# Patient Record
Sex: FEMALE | NOT HISPANIC OR LATINO | URBAN - METROPOLITAN AREA
[De-identification: names, ages, dates, MRNs, and addresses within clinical notes are randomized per-mention and may not be internally consistent; named-entity substitution may affect disease eponyms.]

---

## 2018-05-17 ENCOUNTER — IMPORTED ENCOUNTER (OUTPATIENT)
Dept: URBAN - METROPOLITAN AREA CLINIC 31 | Facility: CLINIC | Age: 43
End: 2018-05-17

## 2018-05-17 PROCEDURE — 92014 COMPRE OPH EXAM EST PT 1/>: CPT

## 2018-05-17 PROCEDURE — 92310 CONTACT LENS FITTING OU: CPT

## 2018-05-17 PROCEDURE — 92015 DETERMINE REFRACTIVE STATE: CPT

## 2018-05-17 NOTE — PATIENT DISCUSSION
1.   Insertion removal and care regimen successfully completed today. and Dispense trial contacts OU. To discontinue and call if any problems. 2.  CL check in 2 weeks. 3.   Refractive error

## 2018-06-08 ENCOUNTER — IMPORTED ENCOUNTER (OUTPATIENT)
Dept: URBAN - METROPOLITAN AREA CLINIC 31 | Facility: CLINIC | Age: 43
End: 2018-06-08

## 2018-06-12 PROBLEM — H10.403: Noted: 2018-06-12

## 2018-09-18 NOTE — PATIENT DISCUSSION
POSTERIOR CAPSULAR FIBROSIS/OPACIFICATION, OU- VISUALLY SIGNIFICANT. CONSULT DR. FUENTES FOR YAG EVAL.

## 2018-09-18 NOTE — PATIENT DISCUSSION
Dry Eye Syndrome (EL) Counseling: Dry Eye Syndrome, also known as Keratoconjunctivitis Sicca, is a common condition that occurs when your tears are not able to provide adequate lubrication for your eyes. Symptoms can be exacerbated by environmental factors such as smoke, wind, or prolonged eye use. Treatment options include, but are not limited to, artificial tears, punctal plugs, Restasis, oral omega-3 supplements, and lubricating ointments. I have explained to the patient that succesful management is dependent on patient compliance with treatment as prescribed and/or the treatment regimen.

## 2019-07-11 ENCOUNTER — IMPORTED ENCOUNTER (OUTPATIENT)
Dept: URBAN - METROPOLITAN AREA CLINIC 31 | Facility: CLINIC | Age: 44
End: 2019-07-11

## 2019-07-11 PROCEDURE — 92014 COMPRE OPH EXAM EST PT 1/>: CPT

## 2019-07-11 PROCEDURE — 92015 DETERMINE REFRACTIVE STATE: CPT

## 2019-07-11 NOTE — PATIENT DISCUSSION
1.  Refractive error - New progressives2. Return for an appointment in 1 year for comprehensive exam. with Dr. Danny Escalera.

## 2020-05-18 NOTE — PATIENT DISCUSSION
POSTERIOR CAPSULAR FIBROSIS/OPACIFICATION, OU - VISUALLY SIGNIFICANT. SCHEDULE YAG CAPSULOTOMY OD FIRST THEN LATER OS IF VISUAL SYMPTOMS PERSIST.  PT THINKS RIGHT EYE IS WORSE

## 2020-07-14 NOTE — PATIENT DISCUSSION
EL OU - PT MENTIONED SHE WAS SEEING  FOR EL AT END OF THE MONTH, CHECKED AND SHE DID NOT HAVE AN APPT. THEREFORE, RTC X 1 MO FOR EL EVAL/PP INSERTION PRN.

## 2020-08-11 NOTE — PATIENT DISCUSSION
INSERTION OF PUNCTAL PLUGS- I have discussed the indications, alternatives, potential benefits and risks including, but not limited to, infection, duct irritation, discomfort, excessive tearing and/or reinsertion. After instillation of topical anesthesia and gentle stretching of the inferior puncta, a punctal plug was placed in each lower lid. The patient tolerated the procedure well.

## 2020-08-11 NOTE — PATIENT DISCUSSION
EL/K SICCA, OU - PRESCRIBED ARTIFICIAL TEARS BID - QID, OU AND THE DAILY INTAKE OF OMEGA 3/FATTY ACIDS. PERM PP INSERTED RLL/LLL; PT TOLERATED PROCEDURE WELL TODAY AND RELEASED. FOLLOW.

## 2020-12-15 ENCOUNTER — IMPORTED ENCOUNTER (OUTPATIENT)
Dept: URBAN - METROPOLITAN AREA CLINIC 31 | Facility: CLINIC | Age: 45
End: 2020-12-15

## 2020-12-15 PROCEDURE — 92015 DETERMINE REFRACTIVE STATE: CPT

## 2020-12-15 PROCEDURE — 92014 COMPRE OPH EXAM EST PT 1/>: CPT

## 2020-12-15 NOTE — PATIENT DISCUSSION
1.  Refractive error - recommend [employer] Screen for computer work. 2. Return for an appointment in 1 year for comprehensive exam. with Dr. Colton Borrego.

## 2021-10-05 NOTE — PATIENT DISCUSSION
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 Will get baseline OCT mac today.

## 2021-11-22 ENCOUNTER — IMPORTED ENCOUNTER (OUTPATIENT)
Dept: URBAN - METROPOLITAN AREA CLINIC 31 | Facility: CLINIC | Age: 46
End: 2021-11-22

## 2021-11-22 PROCEDURE — 92015 DETERMINE REFRACTIVE STATE: CPT

## 2021-11-22 PROCEDURE — 92014 COMPRE OPH EXAM EST PT 1/>: CPT

## 2021-11-22 NOTE — PATIENT DISCUSSION
1.  Refractive error - Glasses change optional. Not wearing Cls. 2. Return for an appointment in 1 year for comprehensive exam. with Dr. Denise Ellsworth.

## 2022-04-01 ASSESSMENT — TONOMETRY
OS_IOP_MMHG: 13
OD_IOP_MMHG: 16
OS_IOP_MMHG: 14
OD_IOP_MMHG: 13
OD_IOP_MMHG: 14
OD_IOP_MMHG: 15
OS_IOP_MMHG: 15
OS_IOP_MMHG: 16

## 2022-04-01 ASSESSMENT — VISUAL ACUITY
OD_SC: 20/20
OS_SC: 20/20-2
OD_SC: 20/20
OS_SC: 20/20-1
OD_SC: 20/20-1
OD_CC: 20/40+1
OU_CC: J1+
OS_CC: 20/30-2
OD_SC: 20/20-1
OD_SC: 20/20-1
OS_SC: 20/20
OS_SC: 20/20
OU_SC: 20/20
OS_SC: 20/20

## 2023-05-01 ENCOUNTER — ESTABLISHED PATIENT (OUTPATIENT)
Dept: URBAN - METROPOLITAN AREA CLINIC 31 | Facility: CLINIC | Age: 48
End: 2023-05-01

## 2023-05-01 DIAGNOSIS — S05.00XA: ICD-10-CM

## 2023-05-01 PROCEDURE — 99213 OFFICE O/P EST LOW 20 MIN: CPT

## 2023-05-01 ASSESSMENT — VISUAL ACUITY
OD_CC: 20/25
OS_CC: 20/30

## 2023-05-03 ENCOUNTER — ESTABLISHED PATIENT (OUTPATIENT)
Dept: URBAN - METROPOLITAN AREA CLINIC 31 | Facility: CLINIC | Age: 48
End: 2023-05-03

## 2023-05-03 DIAGNOSIS — S05.02XD: ICD-10-CM

## 2023-05-03 PROCEDURE — 99213 OFFICE O/P EST LOW 20 MIN: CPT

## 2023-05-03 ASSESSMENT — VISUAL ACUITY
OS_CC: 20/40
OD_CC: 20/20

## 2023-10-30 ENCOUNTER — COMPREHENSIVE EXAM (OUTPATIENT)
Dept: URBAN - METROPOLITAN AREA CLINIC 29 | Facility: CLINIC | Age: 48
End: 2023-10-30

## 2023-10-30 DIAGNOSIS — H52.223: ICD-10-CM

## 2023-10-30 DIAGNOSIS — H52.4: ICD-10-CM

## 2023-10-30 DIAGNOSIS — H52.13: ICD-10-CM

## 2023-10-30 PROCEDURE — 92014 COMPRE OPH EXAM EST PT 1/>: CPT

## 2023-10-30 PROCEDURE — 92015 DETERMINE REFRACTIVE STATE: CPT

## 2023-10-30 ASSESSMENT — VISUAL ACUITY
OS_CC: 20/25-2
OD_SC: 20/40-2
OS_SC: 20/20-1
OD_PH: 20/20-1
OD_CC: 20/40-3

## 2023-10-30 ASSESSMENT — TONOMETRY
OS_IOP_MMHG: 13
OD_IOP_MMHG: 12

## 2024-10-28 ENCOUNTER — COMPREHENSIVE EXAM (OUTPATIENT)
Dept: URBAN - METROPOLITAN AREA CLINIC 31 | Facility: CLINIC | Age: 49
End: 2024-10-28

## 2024-10-28 PROCEDURE — 99213 OFFICE O/P EST LOW 20 MIN: CPT

## 2024-10-28 RX ORDER — OFLOXACIN 3 MG/ML: 1 SOLUTION OPHTHALMIC

## 2024-11-07 ENCOUNTER — COMPREHENSIVE EXAM (OUTPATIENT)
Dept: URBAN - METROPOLITAN AREA CLINIC 29 | Facility: CLINIC | Age: 49
End: 2024-11-07

## 2024-11-07 DIAGNOSIS — H52.223: ICD-10-CM

## 2024-11-07 DIAGNOSIS — H52.13: ICD-10-CM

## 2024-11-07 DIAGNOSIS — H52.4: ICD-10-CM

## 2024-11-07 PROCEDURE — 92014 COMPRE OPH EXAM EST PT 1/>: CPT

## 2024-11-07 PROCEDURE — 92015 DETERMINE REFRACTIVE STATE: CPT
